# Patient Record
(demographics unavailable — no encounter records)

---

## 2025-04-17 NOTE — END OF VISIT
[FreeTextEntry4] : This note was written by Mohsen Gonzalez on 04/17/2025 actively solely Sean Hdez M.D. I, Mohsen Gonzalez, am scribing for and in the presence of Sean Hdez M.D. in the following sections HISTORY OF PRESENT ILLNESS, PAST MEDICAL/FAMILY/SOCIAL HISTORY; REVIEW OF SYSTEMS; VITAL SIGNS; PHYSICAL EXAM; ASSESSMENT/PLAN. All medical record entries made by this scribe at , Sean Hdez M.D. direction and personally dictated by me on 04/17/2025. I personally performed the services described in the documentation, reviewed the documentation recorded by the scribe in my presence, and it accurately and completely records my words and actions.

## 2025-04-17 NOTE — HISTORY OF PRESENT ILLNESS
[FreeTextEntry1] : 04/17/2025 cc renal cyst. Pt is a 39 year old male presenting for a new patient visit with c/o kidney mass. Pt reports abnormality was visualized in kidney after going to the hospital with fever, body aches, and abdominal pain. Pt also reports having diarrhea and large intestine inflamed. Pt reports his urination and erections are fine. Pt reports surgical history in shoulder, knees, and ankle. Pt denies family history of kidney cancer or kidney problems. Pt reports his father had his kidney removed but has recovered and not on dialysis, pt is unsure if the mass was cancerous. Pt reports his bowel movements have regulated after taking abx.    KIDNEYS/URETERS: Symmetric enhancement. A 1 cm and subcentimeter hypodensity within the left renal upper to midpole, too small to characterize. No hydronephrosis, intrarenal calculus or ureteral calculus bilaterally.  PMHx: Gastric sleeve

## 2025-04-17 NOTE — ASSESSMENT
[FreeTextEntry1] : Pt is a 39 year old male with renal lesion  Discussed difference between cystic and solid lesions with the pt.+ Discussed scheduling US with pt in order to monitor the lesion for changes.  f/u sono 6 months